# Patient Record
Sex: MALE | Race: OTHER | Employment: OTHER | URBAN - METROPOLITAN AREA
[De-identification: names, ages, dates, MRNs, and addresses within clinical notes are randomized per-mention and may not be internally consistent; named-entity substitution may affect disease eponyms.]

---

## 2023-05-24 LAB
CREAT ?TM UR-SCNC: 108.6 UMOL/L
EXT ALBUMIN URINE RANDOM: 26.5
HBA1C MFR BLD HPLC: 6.6 %
MICROALBUMIN/CREAT UR: 24 MG/G{CREAT}

## 2023-09-20 LAB — HBA1C MFR BLD HPLC: 6.3 %

## 2024-03-06 LAB — HBA1C MFR BLD HPLC: 6.7 %

## 2024-03-20 ENCOUNTER — OFFICE VISIT (OUTPATIENT)
Age: 49
End: 2024-03-20
Payer: COMMERCIAL

## 2024-03-20 VITALS
HEIGHT: 70 IN | SYSTOLIC BLOOD PRESSURE: 104 MMHG | BODY MASS INDEX: 24.34 KG/M2 | DIASTOLIC BLOOD PRESSURE: 64 MMHG | WEIGHT: 170 LBS

## 2024-03-20 DIAGNOSIS — Z12.11 COLON CANCER SCREENING: ICD-10-CM

## 2024-03-20 DIAGNOSIS — R79.89 ELEVATED LFTS: ICD-10-CM

## 2024-03-20 DIAGNOSIS — K70.9 ALCOHOLIC LIVER DISEASE (HCC): ICD-10-CM

## 2024-03-20 DIAGNOSIS — F10.20 ALCOHOL USE DISORDER, SEVERE, DEPENDENCE (HCC): Primary | ICD-10-CM

## 2024-03-20 DIAGNOSIS — Z11.59 SCREENING FOR VIRAL DISEASE: ICD-10-CM

## 2024-03-20 PROCEDURE — 99204 OFFICE O/P NEW MOD 45 MIN: CPT | Performed by: INTERNAL MEDICINE

## 2024-03-20 RX ORDER — GLIPIZIDE 5 MG/1
5 TABLET ORAL
COMMUNITY

## 2024-03-20 RX ORDER — BUPROPION HYDROCHLORIDE 150 MG/1
TABLET ORAL
COMMUNITY
Start: 2024-02-27

## 2024-03-20 RX ORDER — METOPROLOL SUCCINATE 100 MG/1
TABLET, EXTENDED RELEASE ORAL
COMMUNITY
Start: 2024-02-22

## 2024-03-20 RX ORDER — LISINOPRIL AND HYDROCHLOROTHIAZIDE 12.5; 1 MG/1; MG/1
TABLET ORAL
COMMUNITY
Start: 2024-03-19

## 2024-03-20 RX ORDER — EMPAGLIFLOZIN 10 MG/1
10 TABLET, FILM COATED ORAL
COMMUNITY
Start: 2024-02-27

## 2024-03-20 RX ORDER — CITALOPRAM 20 MG/1
TABLET ORAL
COMMUNITY
Start: 2024-02-27

## 2024-03-20 NOTE — LETTER
March 21, 2024     Ifeoma Panchal DO  [address]  [facility] [address]    Patient: Julio Peters   YOB: 1975   Date of Visit: 3/20/2024       Dear Dr. Panchal:    Thank you for referring Julio Peters to me for evaluation. Below are my notes for this consultation.    If you have questions, please do not hesitate to call me. I look forward to following your patient along with you.         Sincerely,        Joseph Velasquez MD        CC: No Recipients    Joseph Velasquez MD  3/21/2024 12:00 PM  Sign when Signing Visit    [facility] - Outpatient Consultation  Julio Peters 48 y.o. male MRN: [id number]  Encounter: [id number]    ASSESSMENT AND PLAN:      1. Alcohol use disorder, severe, dependence (HCC)  --Patient drinks about 7 beers a day.  He did stop drinking about a month and a half last fall after he was in rehab for 1 week.  -Planning to go back to rehab soon for longer period  -Patient probably would benefit from a medication like naltrexone to prevent cravings.  -Not prescribed it would be reasonable to try to find patient a provider who can give this medication  -Meantime reassessed liver with imaging studies and fib 4    Consider Naltrexone post detox/rehab treatment       2. Alcoholic liver disease (HCC)  --Patient with heavy alcohol use over the last 10 years.  Has been pretty much without interruption.  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  -Reassessing patient with imaging studies      - US abdomen complete; Future  - FIB-4 w/Reflex LAKE FibroSure; Future  - US elastography; Future    -On physical examination does not have stigmata of cirrhosis.  However if he continues on this course he will develop this over the next couple of years most likely and I have reviewed with him potential complications of cirrhosis including ascites, mental confusion, esophageal bleeding from varices and hepatocellular carcinoma    3.  Colon cancer screening    -Concerns about proceeding with colonoscopy but this patient was actively drinking.  With the volume of alcohol intake he would be at risk during the preparation and procedure process for alcohol withdrawal syndrome-probably safer at this time to proceed just with a Cologuard study unless the patient gets to rehab and stops drinking  -If he accomplishes this would be happy to schedule colonoscopy instead of Cologuard screening  -Can decide on this at time of follow-up visit as patient is low risk and this is a nonurgent matter    - Cologuard    4. Screening for viral disease  Screen for alternative reasons for elevated LFTs.  -Check chronic hepatitis profile    5. Elevated LFTs  --As above would like to monitor platelet count at this time as well.  - CBC and differential; Future  - Iron Panel (Includes Ferritin, Iron Sat%, Iron, and TIBC); Future  - Anti-smooth muscle antibody, IgG; Future  - CBC and differential  - Anti-smooth muscle antibody, IgG      Followup Appointment:  3 mo   ______________________________________________________________________     Patient referred for evaluation for abnormal liver function studies and alcoholic liver disease along with colon cancer screening by his personal physician Dr. Panchal    Chief Complaint   Patient presents with   • fatty liver and colon consult       HPI:   Julio Peters is a 48 y.o. year old male who presents for evaluation of alcoholic related liver disease.  Patient was noted by Dr. Panchal to have significantly elevated transaminases over the last several years.  Patient's transaminases have been consistently 2-3 times normal range and sometimes even higher.  Last September patient's AST and ALT with 220 and 221 respectively with normals being 40 and 44.  The patient subsequently went into rehab for a week and a half and repeat LFTs showed dramatic improvement with an AST of 70 and 73.  For at least 10 years the patient has been  drinking 7 beers a day.  If he has to attend to something he can cut it back to 1 or 2 beers a day but does drink on a daily basis.  He is never missed work or had a DUI as a result of his drinking.  Alcoholism runs in the family.  Patient has never quit drinking for more than a month and a half or so.  Patient also has a history of type 2 diabetes and hypertension he has no history of hepatitis.  no history of IV drug use.  Patient denies any abdominal pain.  Overall he feels well.  He does have a history of depression.  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    Historical Information  Past Medical History:   Diagnosis Date   • Alcohol dependence (HCC)    • Alcoholic fatty liver    • Allergic rhinitis    • Chronic recurrent major depressive disorder (HCC)    • Elevated LFTs    • HTN (hypertension)    • Overweight    • Psoriasis    • Steatosis of liver    • Type 2 diabetes mellitus with proteinuria     • Vitamin B deficiency      History reviewed. No pertinent surgical history.  Social History     Substance and Sexual Activity   Alcohol Use Yes    Comment: 7 beers a day     Social History     Substance and Sexual Activity   Drug Use Never     Social History     Tobacco Use   Smoking Status Never   Smokeless Tobacco Never     Family History   Problem Relation Age of Onset   • Diabetes Mother    • Hypertension Mother    • Hyperlipidemia Mother    • Hyperlipidemia Father    • Hypertension Father    • Diabetes Father    • Alcohol abuse Father    • Colon polyps Neg Hx    • Colon cancer Neg Hx        Meds/Allergies    Current Outpatient Medications:   •  buPROPion (WELLBUTRIN XL) 150 mg 24 hr tablet  •  citalopram (CeleXA) 20 mg tablet  •  glipiZIDE (GLUCOTROL) 5 mg tablet  •  Jardiance 10 MG TABS tablet  •  lisinopril-hydrochlorothiazide (PRINZIDE,ZESTORETIC) 10-12.5 MG per tablet  •  metoprolol succinate (TOPROL-XL) 100 mg 24 hr tablet    No Known Allergies    PHYSICAL EXAM:    Blood pressure 104/64, height 5'  "10\" (1.778 m), weight 77.1 kg (170 lb). Body mass index is 24.39 kg/m².  General Appearance: NAD, cooperative, alert  Eyes: Anicteric, conjunctiva pink   ENT:  Normocephalic, atraumatic, normal mucosa.-Perhaps mild parotid enlargement  Neck:  Supple, symmetrical, trachea midline,   Chest no gynecomastia  Resp:  Clear to auscultation bilaterally; no rales, rhonchi or wheezing; respirations unlabored   CV:  S1 S2, Regular rate and rhythm; no murmur, rub, or gallop.  GI:  Soft, non-tender, non-distended; normal bowel sounds; no masses, -liver edge palpable  Rectal: Deferred  Musculoskeletal: No cyanosis, clubbing or edema. Normal ROM.  Skin:  No jaundice, rashes, or lesions -no spider angiomata noted  Heme/Lymph: No palpable cervical lymphadenopathy  Psych: Normal affect, good eye contact  Neuro: No gross deficits, AAOx3    Lab Results:   [facility] labs 3/6/2024 bilirubin 0.5   normal ranges 0-40 and 0-44 respectively-albumin 4.4  CBC [facility] 11/1/2023 13 hematocrit 33 8 platelet count 160  Hemoglobin A1c 6.4 lipid profile normal      Radiology Results:   Abdominal ultrasound January 2021 [facility]-increased echogenicity of the liver consistent with fatty liver.  No splenomegaly      REVIEW OF SYSTEMS:    CONSTITUTIONAL: Denies any fever, chills, rigors, and weight loss.  HEENT: No earache or tinnitus. Denies hearing loss or visual disturbances.  CARDIOVASCULAR: No chest pain or palpitations.   RESPIRATORY: Denies any cough, hemoptysis, shortness of breath or dyspnea on exertion.  GASTROINTESTINAL: As noted in the History of Present Illness.   GENITOURINARY: No problems with urination. Denies any hematuria or dysuria.  NEUROLOGIC: No dizziness or vertigo, denies headaches.   MUSCULOSKELETAL: Denies any muscle or joint pain.   SKIN: Denies skin rashes or itching.   ENDOCRINE: Denies excessive thirst. Denies intolerance to heat or cold.  PSYCHOSOCIAL: Positive for depression  Denies " any recent memory loss.

## 2024-03-20 NOTE — PATIENT INSTRUCTIONS
[facility] - Outpatient Consultation  Julio Peters 48 y.o. male MRN: [id number]  Encounter: [id number]    ASSESSMENT AND PLAN:      1. Alcohol use disorder, severe, dependence (HCC)  --Patient drinks about 7 beers a day.  He did stop drinking about a month and a half last fall after he was in rehab for 1 week.  -Planning to go back to rehab soon for longer period  -Patient probably would benefit from a medication like naltrexone to prevent cravings.  -Not prescribed it would be reasonable to try to find patient a provider who can give this medication  -Meantime reassessed liver with imaging studies and fib 4      Consider Naltrexone post detox/rehab treatment       2. Alcoholic liver disease (HCC)  --Patient with heavy alcohol use over the last 10 years.  Has been pretty much without interruption.  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  -Reassessing patient with imaging studies      - US abdomen complete; Future  - FIB-4 w/Reflex [place]KE FibroSure; Future  - US elastography; Future    -On physical examination does not have stigmata of cirrhosis.  However if he continues on this course he will develop this over the next couple of years most likely and I have reviewed with him potential complications of cirrhosis including ascites, mental confusion, esophageal bleeding from varices and hepatocellular carcinoma    3. Colon cancer screening    -Concerns about proceeding with colonoscopy but this patient was actively drinking.  With the volume of alcohol intake he would be at risk during the preparation and procedure process for alcohol withdrawal syndrome-probably safer at this time to proceed just with a Cologuard study unless the patient gets to rehab and stops drinking  -If he accomplishes this would be happy to schedule colonoscopy instead of Cologuard screening  -Can decide on this at time of follow-up visit as patient is low risk and this is a  nonurgent matter    - Cologuard    4. Screening for viral disease  Screen for alternative reasons for elevated LFTs.  -Check chronic hepatitis profile    5. Elevated LFTs  --As above would like to monitor platelet count at this time as well.  - CBC and differential; Future  - Iron Panel (Includes Ferritin, Iron Sat%, Iron, and TIBC); Future  - Anti-smooth muscle antibody, IgG; Future  - CBC and differential  - Anti-smooth muscle antibody, IgG      Followup Appointment:  3 mo

## 2024-03-20 NOTE — PROGRESS NOTES
[facility] - Outpatient Consultation  Julio Peters 48 y.o. male MRN: [id number]  Encounter: [id number]    ASSESSMENT AND PLAN:      1. Alcohol use disorder, severe, dependence (HCC)  --Patient drinks about 7 beers a day.  He did stop drinking about a month and a half last fall after he was in rehab for 1 week.  -Planning to go back to rehab soon for longer period  -Patient probably would benefit from a medication like naltrexone to prevent cravings.  -Not prescribed it would be reasonable to try to find patient a provider who can give this medication  -Meantime reassessed liver with imaging studies and fib 4    Consider Naltrexone post detox/rehab treatment       2. Alcoholic liver disease (HCC)  --Patient with heavy alcohol use over the last 10 years.  Has been pretty much without interruption.  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  -Reassessing patient with imaging studies      - US abdomen complete; Future  - FIB-4 w/Reflex LAKE FibroSure; Future  - US elastography; Future    -On physical examination does not have stigmata of cirrhosis.  However if he continues on this course he will develop this over the next couple of years most likely and I have reviewed with him potential complications of cirrhosis including ascites, mental confusion, esophageal bleeding from varices and hepatocellular carcinoma    3. Colon cancer screening    -Concerns about proceeding with colonoscopy but this patient was actively drinking.  With the volume of alcohol intake he would be at risk during the preparation and procedure process for alcohol withdrawal syndrome-probably safer at this time to proceed just with a Cologuard study unless the patient gets to rehab and stops drinking  -If he accomplishes this would be happy to schedule colonoscopy instead of Cologuard screening  -Can decide on this at time of follow-up visit as patient is low risk and this is a  nonurgent matter    - Cologuard    4. Screening for viral disease  Screen for alternative reasons for elevated LFTs.  -Check chronic hepatitis profile    5. Elevated LFTs  --As above would like to monitor platelet count at this time as well.  - CBC and differential; Future  - Iron Panel (Includes Ferritin, Iron Sat%, Iron, and TIBC); Future  - Anti-smooth muscle antibody, IgG; Future  - CBC and differential  - Anti-smooth muscle antibody, IgG      Followup Appointment:  3 mo   ______________________________________________________________________     Patient referred for evaluation for abnormal liver function studies and alcoholic liver disease along with colon cancer screening by his personal physician Dr. Panchal    Chief Complaint   Patient presents with   • fatty liver and colon consult       HPI:   Julio Peters is a 48 y.o. year old male who presents for evaluation of alcoholic related liver disease.  Patient was noted by Dr. Panchal to have significantly elevated transaminases over the last several years.  Patient's transaminases have been consistently 2-3 times normal range and sometimes even higher.  Last September patient's AST and ALT with 220 and 221 respectively with normals being 40 and 44.  The patient subsequently went into rehab for a week and a half and repeat LFTs showed dramatic improvement with an AST of 70 and 73.  For at least 10 years the patient has been drinking 7 beers a day.  If he has to attend to something he can cut it back to 1 or 2 beers a day but does drink on a daily basis.  He is never missed work or had a DUI as a result of his drinking.  Alcoholism runs in the family.  Patient has never quit drinking for more than a month and a half or so.  Patient also has a history of type 2 diabetes and hypertension he has no history of hepatitis.  no history of IV drug use.  Patient denies any abdominal pain.  Overall he feels well.  He does have a history of depression.  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    Historical Information   Past Medical History:   Diagnosis Date   • Alcohol dependence (HCC)    • Alcoholic fatty liver    • Allergic rhinitis    • Chronic recurrent major depressive disorder (HCC)    • Elevated LFTs    • HTN (hypertension)    • Overweight    • Psoriasis    • Steatosis of liver    • Type 2 diabetes mellitus with proteinuria     • Vitamin B deficiency      History reviewed. No pertinent surgical history.  Social History     Substance and Sexual Activity   Alcohol Use Yes    Comment: 7 beers a day     Social History     Substance and Sexual Activity   Drug Use Never     Social History     Tobacco Use   Smoking Status Never   Smokeless Tobacco Never     Family History   Problem Relation Age of Onset   • Diabetes Mother    • Hypertension Mother    • Hyperlipidemia Mother    • Hyperlipidemia Father    • Hypertension Father    • Diabetes Father    • Alcohol abuse Father    • Colon polyps Neg Hx    • Colon cancer Neg Hx        Meds/Allergies     Current Outpatient Medications:   •  buPROPion (WELLBUTRIN XL) 150 mg 24 hr tablet  •  citalopram (CeleXA) 20 mg tablet  •  glipiZIDE (GLUCOTROL) 5 mg tablet  •  Jardiance 10 MG TABS tablet  •  lisinopril-hydrochlorothiazide (PRINZIDE,ZESTORETIC) 10-12.5 MG per tablet  •  metoprolol succinate (TOPROL-XL) 100 mg 24 hr tablet    No Known Allergies    PHYSICAL EXAM:    Blood pressure 104/64, height 5' 10\" (1.778 m), weight 77.1 kg (170 lb). Body mass index is 24.39 kg/m².  General Appearance: NAD, cooperative, alert  Eyes: Anicteric, conjunctiva pink   ENT:  Normocephalic, atraumatic, normal mucosa.-Perhaps mild parotid enlargement  Neck:  Supple, symmetrical, trachea midline,   Chest no gynecomastia  Resp:  Clear to auscultation bilaterally; no rales, rhonchi or wheezing; respirations unlabored   CV:  S1 S2, Regular rate and rhythm; no murmur, rub, or gallop.  GI:  Soft, non-tender, non-distended; normal bowel sounds; no masses, -liver " edge palpable  Rectal: Deferred  Musculoskeletal: No cyanosis, clubbing or edema. Normal ROM.  Skin:  No jaundice, rashes, or lesions -no spider angiomata noted  Heme/Lymph: No palpable cervical lymphadenopathy  Psych: Normal affect, good eye contact  Neuro: No gross deficits, AAOx3    Lab Results:   [facility] labs 3/6/2024 bilirubin 0.5   normal ranges 0-40 and 0-44 respectively-albumin 4.4  CBC [facility] 11/1/2023 13 hematocrit 33 8 platelet count 160  Hemoglobin A1c 6.4 lipid profile normal      Radiology Results:   Abdominal ultrasound January 2021 [facility]-increased echogenicity of the liver consistent with fatty liver.  No splenomegaly      REVIEW OF SYSTEMS:    CONSTITUTIONAL: Denies any fever, chills, rigors, and weight loss.  HEENT: No earache or tinnitus. Denies hearing loss or visual disturbances.  CARDIOVASCULAR: No chest pain or palpitations.   RESPIRATORY: Denies any cough, hemoptysis, shortness of breath or dyspnea on exertion.  GASTROINTESTINAL: As noted in the History of Present Illness.   GENITOURINARY: No problems with urination. Denies any hematuria or dysuria.  NEUROLOGIC: No dizziness or vertigo, denies headaches.   MUSCULOSKELETAL: Denies any muscle or joint pain.   SKIN: Denies skin rashes or itching.   ENDOCRINE: Denies excessive thirst. Denies intolerance to heat or cold.  PSYCHOSOCIAL: Positive for depression  Denies any recent memory loss.

## 2024-03-21 PROBLEM — F10.20 ALCOHOL USE DISORDER, SEVERE, DEPENDENCE (HCC): Status: ACTIVE | Noted: 2024-03-21

## 2024-03-23 LAB
A2 MACROGLOB SERPL-MCNC: 142 MG/DL (ref 110–276)
ACTIN IGG SERPL-ACNC: 11 UNITS (ref 0–19)
ALT SERPL W P-5'-P-CCNC: 114 IU/L (ref 0–55)
ALT SERPL-CCNC: 102 IU/L (ref 0–44)
APO A-I SERPL-MCNC: 152 MG/DL (ref 101–178)
AST SERPL W P-5'-P-CCNC: 120 IU/L (ref 0–40)
AST SERPL-CCNC: 116 IU/L (ref 0–40)
BASOPHILS # BLD AUTO: 0 X10E3/UL (ref 0–0.2)
BASOPHILS NFR BLD AUTO: 0 %
BILIRUB SERPL-MCNC: 0.6 MG/DL (ref 0–1.2)
CHOLEST SERPL-MCNC: 219 MG/DL (ref 100–199)
EOSINOPHIL # BLD AUTO: 0.1 X10E3/UL (ref 0–0.4)
EOSINOPHIL NFR BLD AUTO: 1 %
ERYTHROCYTE [DISTWIDTH] IN BLOOD BY AUTOMATED COUNT: 11.9 % (ref 11.6–15.4)
FERRITIN SERPL-MCNC: 378 NG/ML (ref 30–400)
FIB 4 INDEX: 2.71 (ref 0–2.67)
FIBROSIS SCORING:: ABNORMAL
FIBROSIS STAGE SERPL QL: ABNORMAL
GGT SERPL-CCNC: 238 IU/L (ref 0–65)
GLUCOSE SERPL-MCNC: 147 MG/DL (ref 70–99)
HAPTOGLOB SERPL-MCNC: 120 MG/DL (ref 23–355)
HAV IGM SERPL QL IA: NEGATIVE
HBV CORE IGM SERPL QL IA: NEGATIVE
HBV SURFACE AG SERPL QL IA: NEGATIVE
HCT VFR BLD AUTO: 46.3 % (ref 37.5–51)
HCV AB S/CO SERPL IA: NON REACTIVE
HGB BLD-MCNC: 15.6 G/DL (ref 13–17.7)
IMM GRANULOCYTES # BLD: 0 X10E3/UL (ref 0–0.1)
IMM GRANULOCYTES NFR BLD: 0 %
INTERPRETATION: ABNORMAL
IRON SATN MFR SERPL: 23 % (ref 15–55)
IRON SERPL-MCNC: 85 UG/DL (ref 38–169)
LABORATORY COMMENT REPORT: ABNORMAL
LIVER FIBR SCORE SERPL CALC.FIBROSURE: 0.27 (ref 0–0.21)
LYMPHOCYTES # BLD AUTO: 1.9 X10E3/UL (ref 0.7–3.1)
LYMPHOCYTES NFR BLD AUTO: 30 %
MCH RBC QN AUTO: 29.1 PG (ref 26.6–33)
MCHC RBC AUTO-ENTMCNC: 33.7 G/DL (ref 31.5–35.7)
MCV RBC AUTO: 86 FL (ref 79–97)
MONOCYTES # BLD AUTO: 0.7 X10E3/UL (ref 0.1–0.9)
MONOCYTES NFR BLD AUTO: 12 %
NASH GRADE SERPL QL: ABNORMAL
NASH SCORE SERPL: 0.88 (ref 0–0.25)
NEUTROPHILS # BLD AUTO: 3.6 X10E3/UL (ref 1.4–7)
NEUTROPHILS NFR BLD AUTO: 57 %
PLATELET # BLD AUTO: 195 X10E3/UL (ref 150–450)
RBC # BLD AUTO: 5.37 X10E6/UL (ref 4.14–5.8)
REF LAB TEST METHOD: ABNORMAL
SL AMB INTERPRETATION: NORMAL
SL AMB NASH SCORING: ABNORMAL
SL AMB STEATOSIS GRADE: ABNORMAL
SL AMB STEATOSIS SCORE: 0.86 (ref 0–0.4)
STEATOSIS SCORING: ABNORMAL
TEST PERFORMANCE INFO SPEC: ABNORMAL
TIBC SERPL-MCNC: 376 UG/DL (ref 250–450)
TRIGL SERPL-MCNC: 161 MG/DL (ref 0–149)
UIBC SERPL-MCNC: 291 UG/DL (ref 111–343)
WBC # BLD AUTO: 6.4 X10E3/UL (ref 3.4–10.8)

## 2024-03-25 ENCOUNTER — TELEPHONE (OUTPATIENT)
Dept: GASTROENTEROLOGY | Facility: CLINIC | Age: 49
End: 2024-03-25

## 2024-03-25 DIAGNOSIS — Z11.59 SCREENING FOR VIRAL DISEASE: Primary | ICD-10-CM

## 2024-03-25 NOTE — RESULT ENCOUNTER NOTE
I called the patient and left a voice message.  Fib 4 test indicates possible cirrhosis by elevated levels.  FibroSure test does not show's findings consistent with cirrhosis and low level of scarring.  High levels noted of steatosis not unexpected.  Iron panel smooth muscle antibody all negative CBC normal.  Await elasticity study.  Negative acute hepatitis studies but would like to check for chronic antibodies to see if patient needs vaccination.  Will place order.  Please send copy of labs and this note to Dr. CARLOS Panchal [facility]

## 2024-03-25 NOTE — TELEPHONE ENCOUNTER
Left message for patient about labs.  Had acute hepatitis panel but would like to check chronic hepatitis panel please send slip to patient

## 2024-03-28 NOTE — TELEPHONE ENCOUNTER
Pt was calling to find out more information as to why he needs more testing. Please reach back out to the pt

## 2024-04-01 LAB — COLOGUARD RESULT REPORTABLE: POSITIVE

## 2024-04-02 ENCOUNTER — TELEPHONE (OUTPATIENT)
Dept: GASTROENTEROLOGY | Facility: CLINIC | Age: 49
End: 2024-04-02

## 2024-04-02 NOTE — TELEPHONE ENCOUNTER
I called the patient and left a voice message.  His Cologuard study is positive.  Need to schedule colonoscopy in the endoscopy lab  -Asked patient to call back and let me know his alcohol intake status.  Cutting back quite a bit from at least 6-7 beers a day to 1-2.  If he is drinking heavily will need to postpone.  Will try to schedule him for about 1 month out.  He is on Jardiance so this may need to be held   Please copy PCP

## 2024-04-04 ENCOUNTER — PREP FOR PROCEDURE (OUTPATIENT)
Dept: GASTROENTEROLOGY | Facility: CLINIC | Age: 49
End: 2024-04-04

## 2024-04-04 DIAGNOSIS — Z12.11 SCREENING FOR COLON CANCER: Primary | ICD-10-CM

## 2024-04-04 DIAGNOSIS — R19.5 POSITIVE FIT (FECAL IMMUNOCHEMICAL TEST): ICD-10-CM

## 2024-04-04 NOTE — TELEPHONE ENCOUNTER
Patients GI provider:       Number to return call: ( [phone]    Reason for call: Pt calling back needs to speak to nurse about alcohol intake. Please call back     Scheduled procedure/appointment date if applicable: Apt/procedure

## 2024-04-04 NOTE — TELEPHONE ENCOUNTER
The patient and left a voice message.  Did review the case with anesthesia Dr. Maher.  He feels as though it is probably safe for us to proceed with colonoscopy here at the [facility].  Advising patient to really do his best to cut back to drinking 1-2 beers per day.  0 would be ideal.  Should be at low risk for alcohol withdrawal

## 2024-04-04 NOTE — TELEPHONE ENCOUNTER
Spoke to patient. He stated that his alcohol intake has decreased. He is now drinking 2-3 alcoholic beverages a day.       Scheduled date of colonoscopy (as of today):05/07/24  Physician performing colonoscopy:Dr Velasquez  Location of colonoscopy: [facility]  Bowel prep reviewed with patient: Miralax/dulcolax  Instructions emailed to patient by: rachana  Clearances: none  : Hong Peters    Patient is taking Jardiance

## 2024-04-17 ENCOUNTER — TELEPHONE (OUTPATIENT)
Age: 49
End: 2024-04-17

## 2024-04-17 ENCOUNTER — OFFICE VISIT (OUTPATIENT)
Dept: GASTROENTEROLOGY | Facility: CLINIC | Age: 49
End: 2024-04-17
Payer: COMMERCIAL

## 2024-04-17 ENCOUNTER — TELEPHONE (OUTPATIENT)
Dept: GASTROENTEROLOGY | Facility: CLINIC | Age: 49
End: 2024-04-17

## 2024-04-17 VITALS
DIASTOLIC BLOOD PRESSURE: 90 MMHG | WEIGHT: 170 LBS | HEIGHT: 70 IN | SYSTOLIC BLOOD PRESSURE: 140 MMHG | BODY MASS INDEX: 24.34 KG/M2

## 2024-04-17 DIAGNOSIS — F10.20 ALCOHOL USE DISORDER, SEVERE, DEPENDENCE (HCC): ICD-10-CM

## 2024-04-17 DIAGNOSIS — R19.5 POSITIVE COLORECTAL CANCER SCREENING USING COLOGUARD TEST: ICD-10-CM

## 2024-04-17 DIAGNOSIS — Z12.11 SCREENING FOR COLON CANCER: ICD-10-CM

## 2024-04-17 DIAGNOSIS — K70.9 ALCOHOLIC LIVER DISEASE (HCC): Primary | ICD-10-CM

## 2024-04-17 PROCEDURE — 99214 OFFICE O/P EST MOD 30 MIN: CPT | Performed by: INTERNAL MEDICINE

## 2024-04-17 NOTE — PROGRESS NOTES
"[facility] - Outpatient Follow-up Note  Julio Peters 48 y.o. male MRN: [id number]  Encounter: [id number]    ASSESSMENT AND PLAN:      1. Alcoholic liver disease (HCC)  --Patient with long-term alcohol use.  Has cut back to 3 beers per day.  Recent ultrasonography did not show splenomegaly.  Does have an enlarged liver.  Liver fibrosis panel  ( FIB 4 ) showed findings consistent with cirrhosis i.e. F4 score-  - a second serologic evaluation-than \"LAKE \" fibrosis score was more favorable and just indicated F1 score indicating mild fibrosis.  Will need to sort out inconsistencies with ultrasound elastography     -Will confirm whether patient has cirrhosis.  Check elastography study.  - US elastography; Future  -Repeat LFTs and INR  - check HBV/HAV immune status for potential vaccination     -If it appears that we have confirmed diagnosis of cirrhosis by elastography then we will add EGD to the patient's colonoscopic examination which is being evaluated for positive Cologuard study    .This procedure has been fully reviewed with the patient and written informed consent has been obtained.     2. Positive colorectal cancer screening using Cologuard test  -No major symptoms.  Patient interested in starting rehab this week.  -Originally scheduled for colonoscopy in about 2 weeks.  Will push back till early June and perhaps do EGD at the same time    3. Screening for colon cancer  -Please see above    4. Alcohol use disorder, severe, dependence (HCC)  --Took the initiative and saw her psychiatrist.  Patient does report that the psychiatrist is willing to give naltrexone or other medications to prevent cravings.  -May need a hepatology consult prior to make sure this is safe to administer.      Followup Appointment: 10 weeks  ______________________________________________________________________    Chief Complaint   Patient presents with    Follow-up     Pt had US and labs done, Hep " B     HPI: Patient returns to the office with respect to follow-up for his alcoholic liver disease and a positive Cologuard study.  Patient reports to me that he is cut back on his drinking significantly since our visit a little over a month ago.  Specifically he is previously drinking about 8-9 beers per day.  Now he states it is down to about 3.  Patient is seen psychiatrist as of late and was told that he would be willing to prescribe Vivitrol injections to help prevent cravings in the future.  Patient is willing to start rehab at this time and wanted to know if he should start rehab postpone this until after his scheduled colonoscopy.  This was arranged after finding a positive Cologuard study.  Patient has no overt bleeding.  We followed up on the patient with respect to lab studies.  It should be noted patient's father had history of alcohol dependence and alcoholic liver disease.  He was able to successfully stop drinking completely.  Patient's acute hepatitis panel was negative-a chronic hepatitis panel had been ordered to check on the patient's immune status but this has not been performed.  Smooth muscle antibodies and iron studies were negative.  Patient had a recent abdominal ultrasound which showed hepatomegaly with increased echogenicity consistent steatosis and no splenomegaly.  Patient's liver function studies improved with decrease of his alcohol intake.    Historical Information   Past Medical History:   Diagnosis Date    Alcohol dependence (HCC)     Alcoholic fatty liver     Allergic rhinitis     Chronic recurrent major depressive disorder (HCC)     Elevated LFTs     HTN (hypertension)     Overweight     Psoriasis     Steatosis of liver     Type 2 diabetes mellitus with proteinuria  (HCC)     Vitamin B deficiency      History reviewed. No pertinent surgical history.  Social History     Substance and Sexual Activity   Alcohol Use Yes    Comment: 7 beers a day     Social History     Substance and  "Sexual Activity   Drug Use Never     Social History     Tobacco Use   Smoking Status Never   Smokeless Tobacco Never     Family History   Problem Relation Age of Onset    Diabetes Mother     Hypertension Mother     Hyperlipidemia Mother     Hyperlipidemia Father     Hypertension Father     Diabetes Father     Alcohol abuse Father     Colon polyps Neg Hx     Colon cancer Neg Hx          Current Outpatient Medications:     buPROPion (WELLBUTRIN XL) 150 mg 24 hr tablet    citalopram (CeleXA) 20 mg tablet    glipiZIDE (GLUCOTROL) 5 mg tablet    Jardiance 10 MG TABS tablet    lisinopril-hydrochlorothiazide (PRINZIDE,ZESTORETIC) 10-12.5 MG per tablet    metoprolol succinate (TOPROL-XL) 100 mg 24 hr tablet  No Known Allergies  Reviewed medications and allergies and updated as indicated    PHYSICAL EXAM:    Blood pressure 140/90, height 5' 10\" (1.778 m), weight 77.1 kg (170 lb). Body mass index is 24.39 kg/m².  General Appearance: NAD, cooperative, alert  Eyes: Anicteric, conjunctiva pink  ENT:  Normocephalic, atraumatic, normal mucosa.    Neck:  Supple, symmetrical, trachea midline  Resp:  Clear to auscultation bilaterally; no rales, rhonchi or wheezing; respirations unlabored   CV:  S1 S2, Regular rate and rhythm; no murmur, rub, or gallop.  GI:  Soft, non-tender, non-distended; normal bowel sounds; no masses, no organomegaly   Rectal: Deferred  Musculoskeletal: No cyanosis, clubbing or edema. Normal ROM.  Skin:  No jaundice, rashes, or lesions   Heme/Lymph: No palpable cervical lymphadenopathy  Psych: Normal affect, good eye contact  Neuro: No gross deficits, AAOx3    Lab Results:   Lab Results   Component Value Date    WBC 6.4 03/20/2024    HGB 15.6 03/20/2024    HCT 46.3 03/20/2024    MCV 86 03/20/2024     03/20/2024     Lab Results   Component Value Date     (H) 03/20/2024     (H) 03/20/2024     (H) 03/20/2024     (H) 03/20/2024       Radiology Results:   Ultrasonography [place] " [facility] 3/26/2024-revealed increased echogenicity and an enlarged liver.  No splenomegaly.

## 2024-04-17 NOTE — PATIENT INSTRUCTIONS
"[facility] - Outpatient Follow-up Note  Julio Peters 48 y.o. male MRN: [id number]  Encounter: [id number]    ASSESSMENT AND PLAN:      1. Alcoholic liver disease (HCC)  --Patient with long-term alcohol use.  Has cut back to 3 beers per day.  Recent ultrasonography did not show splenomegaly.  Does have an enlarged liver.  Liver fibrosis panel showed findings consistent with cirrhosis i.e. F4 score  -second serologic evaluation-than \"LAKE \" fibrosis score was more favorable and just indicated F1 score indicating mild fibrosis.  Will need to sort out inconsistencies with ultrasound elastography    -Will confirm whether patient has cirrhosis.  Check elastography study.  - US elastography; Future  -Repeat LFTs and INR  -Check HBV/ HAV immune status for potential vaccination    -If it appears that we have confirmed diagnosis of cirrhosis by elastography then we will add EGD to the patient's colonoscopic examination which is being evaluated for positive Cologuard study    .This procedure has been fully reviewed with the patient and written informed consent has been obtained.     2. Positive colorectal cancer screening using Cologuard test  -No major symptoms.  Patient interested in starting rehab this week.  -Originally scheduled for colonoscopy in about 2 weeks.  Will push back till early June and perhaps do EGD at the same time    3. Screening for colon cancer  -Please see above    4. Alcohol use disorder, severe, dependence (HCC)  --Took the initiative and saw her psychiatrist.  Patient does report that the psychiatrist is willing to give naltrexone or other medications to prevent cravings.  -May need a hepatology consult prior to make sure this is safe to administer.      Followup Appointment: 10 weeks  "

## 2024-04-17 NOTE — TELEPHONE ENCOUNTER
Pt called stating he was called by  and told not to come today and to call to make another appointment to discuss results. Pt was notified that he is still to go to appointment

## 2024-04-17 NOTE — TELEPHONE ENCOUNTER
Scheduled date of combo (as of today): 6/4/2024  Physician performing combo: DIANA  Location of combo: [facility]  Bowel prep reviewed with patient: Miralax  Instructions reviewed with patient by: WD  Clearances: N    Colonoscopy date changed per Pt and Dr. Velasquez. EGD added for combo. WLD

## 2024-04-19 LAB
ALBUMIN SERPL-MCNC: 4.5 G/DL (ref 4.1–5.1)
ALBUMIN/GLOB SERPL: 1.8 {RATIO} (ref 1.2–2.2)
ALP SERPL-CCNC: 79 IU/L (ref 44–121)
ALT SERPL-CCNC: 106 IU/L (ref 0–44)
AST SERPL-CCNC: 151 IU/L (ref 0–40)
BILIRUB SERPL-MCNC: 1.3 MG/DL (ref 0–1.2)
BUN SERPL-MCNC: 8 MG/DL (ref 6–24)
BUN/CREAT SERPL: 10 (ref 9–20)
CALCIUM SERPL-MCNC: 9.2 MG/DL (ref 8.7–10.2)
CHLORIDE SERPL-SCNC: 100 MMOL/L (ref 96–106)
CO2 SERPL-SCNC: 24 MMOL/L (ref 20–29)
CREAT SERPL-MCNC: 0.82 MG/DL (ref 0.76–1.27)
EGFR: 110 ML/MIN/1.73
GLOBULIN SER-MCNC: 2.5 G/DL (ref 1.5–4.5)
GLUCOSE SERPL-MCNC: 177 MG/DL (ref 70–99)
HBV SURFACE AB SER-ACNC: 10.2 MIU/ML
INR PPP: 1.1 (ref 0.9–1.2)
POTASSIUM SERPL-SCNC: 5 MMOL/L (ref 3.5–5.2)
PROT SERPL-MCNC: 7 G/DL (ref 6–8.5)
PROTHROMBIN TIME: 11.2 SEC (ref 9.1–12)
SODIUM SERPL-SCNC: 140 MMOL/L (ref 134–144)

## 2024-05-21 ENCOUNTER — ANESTHESIA (OUTPATIENT)
Dept: ANESTHESIOLOGY | Facility: AMBULATORY SURGERY CENTER | Age: 49
End: 2024-05-21

## 2024-05-21 ENCOUNTER — ANESTHESIA EVENT (OUTPATIENT)
Dept: ANESTHESIOLOGY | Facility: AMBULATORY SURGERY CENTER | Age: 49
End: 2024-05-21

## 2024-05-24 ENCOUNTER — TELEPHONE (OUTPATIENT)
Dept: GASTROENTEROLOGY | Facility: CLINIC | Age: 49
End: 2024-05-24

## 2024-05-24 NOTE — TELEPHONE ENCOUNTER
Procedure confirmed  Colonoscopy/Endoscopy     Via: Spoke with patient.      Instructions given: Given to Patient at Visit     Prep Given: Miralax/Dulcolax    Call the office if there are any questions.

## 2024-05-29 ENCOUNTER — TELEPHONE (OUTPATIENT)
Dept: GASTROENTEROLOGY | Facility: CLINIC | Age: 49
End: 2024-05-29

## 2024-05-29 NOTE — TELEPHONE ENCOUNTER
I called the patient about his elasticity study.  Borderline presence of cirrhosis-moderate liver stiffness  Left message on answering machine.  Patient had been considering checking himself into rehab for alcohol use disorder.  Would like to check on his status.  Need to get him back for colonoscopy for positive Cologuard study.  Will asked staff to reach out

## 2024-06-04 ENCOUNTER — ANESTHESIA EVENT (OUTPATIENT)
Dept: GASTROENTEROLOGY | Facility: AMBULATORY SURGERY CENTER | Age: 49
End: 2024-06-04

## 2024-06-04 ENCOUNTER — HOSPITAL ENCOUNTER (OUTPATIENT)
Dept: GASTROENTEROLOGY | Facility: AMBULATORY SURGERY CENTER | Age: 49
Discharge: HOME | End: 2024-06-04
Payer: COMMERCIAL

## 2024-06-04 ENCOUNTER — ANESTHESIA (OUTPATIENT)
Dept: GASTROENTEROLOGY | Facility: AMBULATORY SURGERY CENTER | Age: 49
End: 2024-06-04

## 2024-06-04 VITALS
HEIGHT: 70 IN | BODY MASS INDEX: 24.34 KG/M2 | RESPIRATION RATE: 16 BRPM | HEART RATE: 75 BPM | OXYGEN SATURATION: 99 % | DIASTOLIC BLOOD PRESSURE: 90 MMHG | TEMPERATURE: 97.3 F | SYSTOLIC BLOOD PRESSURE: 133 MMHG | WEIGHT: 170 LBS

## 2024-06-04 DIAGNOSIS — R19.5 POSITIVE FIT (FECAL IMMUNOCHEMICAL TEST): ICD-10-CM

## 2024-06-04 DIAGNOSIS — Z12.11 SCREENING FOR COLON CANCER: ICD-10-CM

## 2024-06-04 DIAGNOSIS — K70.9 ALCOHOLIC LIVER DISEASE (HCC): ICD-10-CM

## 2024-06-04 PROBLEM — E11.9 TYPE 2 DIABETES MELLITUS, WITHOUT LONG-TERM CURRENT USE OF INSULIN (HCC): Status: ACTIVE | Noted: 2024-06-04

## 2024-06-04 PROBLEM — I10 PRIMARY HYPERTENSION: Status: ACTIVE | Noted: 2024-06-04

## 2024-06-04 PROCEDURE — 45380 COLONOSCOPY AND BIOPSY: CPT | Performed by: INTERNAL MEDICINE

## 2024-06-04 PROCEDURE — 43239 EGD BIOPSY SINGLE/MULTIPLE: CPT | Performed by: INTERNAL MEDICINE

## 2024-06-04 PROCEDURE — 88305 TISSUE EXAM BY PATHOLOGIST: CPT | Performed by: STUDENT IN AN ORGANIZED HEALTH CARE EDUCATION/TRAINING PROGRAM

## 2024-06-04 PROCEDURE — 45385 COLONOSCOPY W/LESION REMOVAL: CPT | Performed by: INTERNAL MEDICINE

## 2024-06-04 RX ORDER — PROPOFOL 10 MG/ML
INJECTION, EMULSION INTRAVENOUS AS NEEDED
Status: DISCONTINUED | OUTPATIENT
Start: 2024-06-04 | End: 2024-06-04

## 2024-06-04 RX ORDER — MIDAZOLAM HYDROCHLORIDE 2 MG/2ML
INJECTION, SOLUTION INTRAMUSCULAR; INTRAVENOUS AS NEEDED
Status: DISCONTINUED | OUTPATIENT
Start: 2024-06-04 | End: 2024-06-04

## 2024-06-04 RX ORDER — SODIUM CHLORIDE, SODIUM LACTATE, POTASSIUM CHLORIDE, CALCIUM CHLORIDE 600; 310; 30; 20 MG/100ML; MG/100ML; MG/100ML; MG/100ML
50 INJECTION, SOLUTION INTRAVENOUS CONTINUOUS
Status: DISCONTINUED | OUTPATIENT
Start: 2024-06-04 | End: 2024-06-08 | Stop reason: HOSPADM

## 2024-06-04 RX ORDER — LIDOCAINE HYDROCHLORIDE 10 MG/ML
INJECTION, SOLUTION EPIDURAL; INFILTRATION; INTRACAUDAL; PERINEURAL AS NEEDED
Status: DISCONTINUED | OUTPATIENT
Start: 2024-06-04 | End: 2024-06-04

## 2024-06-04 RX ADMIN — LIDOCAINE HYDROCHLORIDE 100 MG: 10 INJECTION, SOLUTION EPIDURAL; INFILTRATION; INTRACAUDAL; PERINEURAL at 09:22

## 2024-06-04 RX ADMIN — SODIUM CHLORIDE, SODIUM LACTATE, POTASSIUM CHLORIDE, CALCIUM CHLORIDE: 600; 310; 30; 20 INJECTION, SOLUTION INTRAVENOUS at 09:47

## 2024-06-04 RX ADMIN — PROPOFOL 50 MG: 10 INJECTION, EMULSION INTRAVENOUS at 09:48

## 2024-06-04 RX ADMIN — PROPOFOL 50 MG: 10 INJECTION, EMULSION INTRAVENOUS at 09:27

## 2024-06-04 RX ADMIN — PROPOFOL 50 MG: 10 INJECTION, EMULSION INTRAVENOUS at 09:39

## 2024-06-04 RX ADMIN — PROPOFOL 50 MG: 10 INJECTION, EMULSION INTRAVENOUS at 09:32

## 2024-06-04 RX ADMIN — MIDAZOLAM HYDROCHLORIDE 2 MG: 2 INJECTION, SOLUTION INTRAMUSCULAR; INTRAVENOUS at 09:22

## 2024-06-04 RX ADMIN — SODIUM CHLORIDE, SODIUM LACTATE, POTASSIUM CHLORIDE, CALCIUM CHLORIDE 50 ML/HR: 600; 310; 30; 20 INJECTION, SOLUTION INTRAVENOUS at 08:44

## 2024-06-04 RX ADMIN — PROPOFOL 100 MG: 10 INJECTION, EMULSION INTRAVENOUS at 09:22

## 2024-06-04 NOTE — ANESTHESIA PREPROCEDURE EVALUATION
Procedure:  COLONOSCOPY  EGD    Relevant Problems   CARDIO   (+) Primary hypertension      ENDO   (+) Type 2 diabetes mellitus, without long-term current use of insulin (HCC)      Other   (+) Alcohol use disorder, severe, dependence (HCC) (Down to 2 beers/day. None last 4 days)        Physical Exam    Airway    Mallampati score: II  TM Distance: >3 FB  Neck ROM: full     Dental   No notable dental hx     Cardiovascular      Pulmonary      Other Findings        Anesthesia Plan  ASA Score- 3     Anesthesia Type- IV sedation with anesthesia with ASA Monitors.         Additional Monitors:     Airway Plan:            Plan Factors-Exercise tolerance (METS): >4 METS.    Chart reviewed.    Patient summary reviewed.    Patient is not a current smoker.              Induction- intravenous.    Postoperative Plan-         Informed Consent- Anesthetic plan and risks discussed with patient.  I personally reviewed this patient with the CRNA. Discussed and agreed on the Anesthesia Plan with the CRNA..

## 2024-06-04 NOTE — H&P
History and Physical - [facility]  Julio Peters 48 y.o. male MRN: [id number]    HPI: Julio Peters is a 48 y.o. year old male who presents for EGD and colonoscopy.  Patient does have a history of alcoholic liver disease possible cirrhosis.  In addition had a positive Cologuard study    REVIEW OF SYSTEMS: Per the HPI, and otherwise unremarkable.    Historical Information   Past Medical History:   Diagnosis Date    Alcohol dependence (HCC)     Alcoholic fatty liver     Allergic rhinitis     Chronic recurrent major depressive disorder (HCC)     Diabetes mellitus (HCC)     Elevated LFTs     HTN (hypertension)     Hypertension     Overweight     Psoriasis     Steatosis of liver     Type 2 diabetes mellitus with proteinuria  (HCC)     Vitamin B deficiency      History reviewed. No pertinent surgical history.  Social History   Social History     Substance and Sexual Activity   Alcohol Use Yes    Comment: 2 beers a day     Social History     Substance and Sexual Activity   Drug Use Never     Social History     Tobacco Use   Smoking Status Never   Smokeless Tobacco Never     Family History   Problem Relation Age of Onset    Diabetes Mother     Hypertension Mother     Hyperlipidemia Mother     Hyperlipidemia Father     Hypertension Father     Diabetes Father     Alcohol abuse Father     Colon polyps Neg Hx     Colon cancer Neg Hx        Meds/Allergies       Current Outpatient Medications:     buPROPion (WELLBUTRIN XL) 150 mg 24 hr tablet    citalopram (CeleXA) 20 mg tablet    glipiZIDE (GLUCOTROL) 5 mg tablet    lisinopril-hydrochlorothiazide (PRINZIDE,ZESTORETIC) 10-12.5 MG per tablet    metoprolol succinate (TOPROL-XL) 100 mg 24 hr tablet    Jardiance 10 MG TABS tablet    Current Facility-Administered Medications:     lactated ringers infusion, 50 mL/hr, Intravenous, Continuous, 50 mL/hr at 06/04/24 0844    No Known Allergies    Objective     /93   Pulse 73   Temp (!) 97.3 °F (36.3 °C)  "(Temporal)   Resp (!) 25   Ht 5' 10\" (1.778 m)   Wt 77.1 kg (170 lb)   SpO2 100%   BMI 24.39 kg/m²     PHYSICAL EXAM    Gen: NAD AAOx3  Head: Normocephalic, Atraumatic  CV: S1S2 RRR no m/r/g  CHEST: Clear b/l no c/r/w  ABD: soft, +BS NT/ND  EXT: no edema    ASSESSMENT/PLAN:  This is a 48 y.o. year old male here for EGD and colonoscopy, and he is stable and optimized for his procedure.        "

## 2024-06-04 NOTE — ANESTHESIA POSTPROCEDURE EVALUATION
Post-Op Assessment Note    CV Status:  Stable  Pain Score: 0    Pain management: adequate       Mental Status:  Sleepy   Hydration Status:  Euvolemic   PONV Controlled:  Controlled   Airway Patency:  Patent     Post Op Vitals Reviewed: Yes    No anethesia notable event occurred.    Staff: LIN           BP   101/67   Temp      Pulse 67   Resp 14   SpO2 97%ra

## 2024-06-07 PROCEDURE — 88305 TISSUE EXAM BY PATHOLOGIST: CPT | Performed by: STUDENT IN AN ORGANIZED HEALTH CARE EDUCATION/TRAINING PROGRAM

## 2024-06-08 ENCOUNTER — TELEPHONE (OUTPATIENT)
Age: 49
End: 2024-06-08

## 2024-06-08 DIAGNOSIS — K70.9 ALCOHOLIC LIVER DISEASE (HCC): Primary | ICD-10-CM

## 2024-06-08 NOTE — RESULT ENCOUNTER NOTE
Called patient and reviewed path.  No H. pylori.  Did have a tubulovillous adenoma completely excised high-grade dysplasia in the polyp.  Recall for 3 years.  Please copy patient's PCP

## 2024-06-08 NOTE — TELEPHONE ENCOUNTER
Reviewed results with the patient of his colonoscopic biopsies.  Indicated to him and he did not have esophageal varices.  Elasticity study previously moderately high stiffness 9 in the high and level.  Patient wants to know if his liver disease is reversible at this point and I did tell him there is a good possibility that it is provided he stopped drinking.  Will have patient come back in the office in about 4 to 8 weeks.  Ordering lab studies in the meantime.

## 2024-12-22 LAB
ALBUMIN SERPL-MCNC: 4.3 G/DL (ref 4.1–5.1)
ALP SERPL-CCNC: 74 IU/L (ref 44–121)
ALT SERPL-CCNC: 98 IU/L (ref 0–44)
AST SERPL-CCNC: 69 IU/L (ref 0–40)
BASOPHILS # BLD AUTO: 0 X10E3/UL (ref 0–0.2)
BASOPHILS NFR BLD AUTO: 0 %
BILIRUB SERPL-MCNC: 0.6 MG/DL (ref 0–1.2)
BUN SERPL-MCNC: 6 MG/DL (ref 6–24)
BUN/CREAT SERPL: 8 (ref 9–20)
CALCIUM SERPL-MCNC: 9 MG/DL (ref 8.7–10.2)
CHLORIDE SERPL-SCNC: 105 MMOL/L (ref 96–106)
CO2 SERPL-SCNC: 25 MMOL/L (ref 20–29)
CREAT SERPL-MCNC: 0.77 MG/DL (ref 0.76–1.27)
EGFR: 111 ML/MIN/1.73
EOSINOPHIL # BLD AUTO: 0 X10E3/UL (ref 0–0.4)
EOSINOPHIL NFR BLD AUTO: 1 %
ERYTHROCYTE [DISTWIDTH] IN BLOOD BY AUTOMATED COUNT: 11.7 % (ref 11.6–15.4)
GLOBULIN SER-MCNC: 2.4 G/DL (ref 1.5–4.5)
GLUCOSE SERPL-MCNC: 107 MG/DL (ref 70–99)
HCT VFR BLD AUTO: 46.3 % (ref 37.5–51)
HGB BLD-MCNC: 15.2 G/DL (ref 13–17.7)
IMM GRANULOCYTES # BLD: 0 X10E3/UL (ref 0–0.1)
IMM GRANULOCYTES NFR BLD: 0 %
LYMPHOCYTES # BLD AUTO: 1.7 X10E3/UL (ref 0.7–3.1)
LYMPHOCYTES NFR BLD AUTO: 27 %
MCH RBC QN AUTO: 28.7 PG (ref 26.6–33)
MCHC RBC AUTO-ENTMCNC: 32.8 G/DL (ref 31.5–35.7)
MCV RBC AUTO: 88 FL (ref 79–97)
MONOCYTES # BLD AUTO: 1 X10E3/UL (ref 0.1–0.9)
MONOCYTES NFR BLD AUTO: 15 %
NEUTROPHILS # BLD AUTO: 3.7 X10E3/UL (ref 1.4–7)
NEUTROPHILS NFR BLD AUTO: 57 %
PLATELET # BLD AUTO: 223 X10E3/UL (ref 150–450)
POTASSIUM SERPL-SCNC: 4.4 MMOL/L (ref 3.5–5.2)
PROT SERPL-MCNC: 6.7 G/DL (ref 6–8.5)
RBC # BLD AUTO: 5.29 X10E6/UL (ref 4.14–5.8)
SODIUM SERPL-SCNC: 143 MMOL/L (ref 134–144)
WBC # BLD AUTO: 6.5 X10E3/UL (ref 3.4–10.8)

## 2024-12-23 ENCOUNTER — RESULTS FOLLOW-UP (OUTPATIENT)
Dept: GASTROENTEROLOGY | Facility: CLINIC | Age: 49
End: 2024-12-23

## 2024-12-24 NOTE — RESULT ENCOUNTER NOTE
Reviewed and message sent in [employer].  Patient still with elevated LFTs.  Some improvement.  LFTs 1-1/2-2 times normal AST 1-1/2 time normal ALT 2 times normal  -Advise follow-up this visit 1 to 2 months

## 2025-01-13 ENCOUNTER — TELEPHONE (OUTPATIENT)
Dept: GASTROENTEROLOGY | Facility: CLINIC | Age: 50
End: 2025-01-13

## 2025-01-13 NOTE — TELEPHONE ENCOUNTER
Lvm for pt appt scheduled for 1/14 has been rescheduled from [place] due to no heat to [place] location at 2:00

## 2025-01-14 ENCOUNTER — OFFICE VISIT (OUTPATIENT)
Age: 50
End: 2025-01-14
Payer: COMMERCIAL

## 2025-01-14 VITALS
DIASTOLIC BLOOD PRESSURE: 82 MMHG | HEIGHT: 70 IN | SYSTOLIC BLOOD PRESSURE: 118 MMHG | WEIGHT: 167 LBS | BODY MASS INDEX: 23.91 KG/M2

## 2025-01-14 DIAGNOSIS — K70.9 ALCOHOLIC LIVER DISEASE (HCC): Primary | ICD-10-CM

## 2025-01-14 DIAGNOSIS — E78.5 HYPERLIPIDEMIA, UNSPECIFIED HYPERLIPIDEMIA TYPE: ICD-10-CM

## 2025-01-14 DIAGNOSIS — Z12.11 SCREENING FOR COLON CANCER: ICD-10-CM

## 2025-01-14 DIAGNOSIS — F10.90 ALCOHOL USE DISORDER: ICD-10-CM

## 2025-01-14 DIAGNOSIS — Z86.0100 PERSONAL HISTORY OF COLON POLYPS, UNSPECIFIED: ICD-10-CM

## 2025-01-14 PROCEDURE — 99214 OFFICE O/P EST MOD 30 MIN: CPT | Performed by: INTERNAL MEDICINE

## 2025-01-14 RX ORDER — SITAGLIPTIN 100 MG/1
100 TABLET, FILM COATED ORAL DAILY
COMMUNITY

## 2025-01-14 NOTE — PATIENT INSTRUCTIONS
Name: Julio Peters      : 1975      MRN: [id number]  Encounter Provider: Joseph Velasquez MD  Encounter Date: 2025   Encounter department: [facility] [place]  :  Assessment & Plan  Alcoholic liver disease (HCC)  -Patient with recent inpatient rehab stay.  He had a 14-day session.  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  He is on Vivitrol with good results.  He is seeing psychiatry as well.  He has not been drinking except perhaps a beer or so on the weekends when he is watching football  -Strongly advise continued Vivitrol and counseling.  -Patient's elasticity study last spring showed fibrosis but did not meet the criteria for cirrhosis.  -Patient did have recent repeat labs done in late December and his AST and ALT were just a couple of points over normal much better than they had been previously and repeat labs in about 3 months    Orders:    Comprehensive metabolic panel; Future    CBC and differential; Future    Gamma GT; Future    LAKE Fibrosure; Future    Alcohol use disorder  See above       Hyperlipidemia, unspecified hyperlipidemia type  Patient with mild elevation of cholesterol.  His PCP Dr. Panchal was wondering if she could initiate statin therapy.  No contraindication to statin therapy  -Statin therapy oftentimes hepatic protective and may help in alcoholic liver disease       Screening for colon cancer  To date with screening.  Patient last spring had colonoscopy.  Please see details below       Personal history of colon polyps, unspecified  Patient with a tubulovillous adenoma with dysplasia within the polyp.  This was completely resected.  Patient would be due for recall examination in 2027       4 month

## 2025-01-14 NOTE — ASSESSMENT & PLAN NOTE
Patient with a tubulovillous adenoma with dysplasia within the polyp.  This was completely resected.  Patient would be due for recall examination in June 2027       4 months

## 2025-01-14 NOTE — PROGRESS NOTES
Name: Julio Peters      : 1975      MRN: [id number]  Encounter Provider: Joseph Velasquez MD  Encounter Date: 2025   Encounter department: [facility] [place]  :  Assessment & Plan  Alcoholic liver disease (HCC)  -Patient with recent inpatient rehab stay.  He had a 14-day session.  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  He is on Vivitrol with good results.  He is seeing psychiatry as well.  He has not been drinking except perhaps a beer or so on the weekends when he is watching football  -Strongly advise continued Vivitrol and counseling.    -Patient's elasticity study last spring showed fibrosis but did not meet the criteria for cirrhosis.  -Patient did have recent repeat labs done in late December and his AST and ALT were just a couple of points over normal much better than they had been previously and repeat labs in about 3 months    Orders:    Comprehensive metabolic panel; Future    CBC and differential; Future    Gamma GT; Future    LAKE Fibrosure; Future    Alcohol use disorder  See above       Hyperlipidemia, unspecified hyperlipidemia type  Patient with mild elevation of cholesterol.  His PCP Dr. Panchal was wondering if she could initiate statin therapy.  No contraindication to statin therapy  -Statin therapy oftentimes hepatic protective and may help in alcoholic liver disease       Screening for colon cancer  To date with screening.  Patient last spring had colonoscopy.  Please see details below       Personal history of colon polyps, unspecified  Patient with a tubulovillous adenoma with dysplasia within the polyp.  This was completely resected.  Patient would be due for recall examination in 2027       4 months       History of Present Illness   HPI  Julio Peters is a 49 y.o. male who presents the office for routine follow-up.  Patient was evaluated for abnormal liver function studies and what  "appeared to be alcoholic liver disease.  He had serologic workup which was negative for viral hepatitis.  Patient had a previous history of very heavy alcohol use which continued until this December.  He had a 2-week stay in rehab and has been doing quite well since that time.  He is not completely abstinent but may limit drinking some.  To about once a week when he is watching football.  He is getting Vivitrol.  He is seeing psychiatry.  He denies any abdominal pain.  He feels well.  His PCP Dr. Panchal is inquiring about whether he can be on a statin given his history of diabetes.  Overall he has better energy.  He is working more so he has less time on his hand to drink.  Patient is up-to-date with respect to colon cancer screening.  Did have an examination for positive Cologuard and had a tubulovillous adenoma removed that had some mild dysplasia within the polyp but clear resection margins      Review of Systems       Objective   /82   Ht 5' 10\" (1.778 m)   Wt 75.8 kg (167 lb)   BMI 23.96 kg/m²      Physical Exam  ..General Appearance: NAD, cooperative, alert  Eyes: Anicteric, conjunctiva pink  ENT:  Normocephalic, atraumatic, normal mucosa-mild parotid enlargement.    Neck:    Supple, symmetrical, trachea midline  Resp:  Clear to auscultation bilaterally; no rales, rhonchi or wheezing; respirations unlabored   CV:  S1 S2, Regular rate and rhythm; no murmur, rub, or gallop.  GI:  Soft, non-tender, non-distended; normal bowel sounds; no masses, no organomegaly   Rectal: Deferred  Musculoskeletal: No cyanosis, clubbing or edema. Normal ROM.  Skin:     No jaundice, rashes, or lesions-spider angiomata or palmar erythemaHeme/Lymph: No palpable cervical lymphadenopathy  Psych: Normal affect, good eye contact  Neuro: No gross deficits, AAOx3        "

## 2025-05-09 LAB
ALBUMIN SERPL-MCNC: 4.6 G/DL (ref 4.1–5.1)
ALP SERPL-CCNC: 92 IU/L (ref 44–121)
ALT SERPL-CCNC: 55 IU/L (ref 0–44)
AST SERPL-CCNC: 67 IU/L (ref 0–40)
BASOPHILS # BLD AUTO: 0 X10E3/UL (ref 0–0.2)
BASOPHILS NFR BLD AUTO: 0 %
BILIRUB SERPL-MCNC: 0.6 MG/DL (ref 0–1.2)
BUN SERPL-MCNC: 7 MG/DL (ref 6–24)
BUN/CREAT SERPL: 11 (ref 9–20)
CALCIUM SERPL-MCNC: 9.3 MG/DL (ref 8.7–10.2)
CHLORIDE SERPL-SCNC: 100 MMOL/L (ref 96–106)
CO2 SERPL-SCNC: 18 MMOL/L (ref 20–29)
CREAT SERPL-MCNC: 0.66 MG/DL (ref 0.76–1.27)
EGFR: 116 ML/MIN/1.73
EOSINOPHIL # BLD AUTO: 0 X10E3/UL (ref 0–0.4)
EOSINOPHIL NFR BLD AUTO: 0 %
ERYTHROCYTE [DISTWIDTH] IN BLOOD BY AUTOMATED COUNT: 12.5 % (ref 11.6–15.4)
GGT SERPL-CCNC: 208 IU/L (ref 0–65)
GLOBULIN SER-MCNC: 2.5 G/DL (ref 1.5–4.5)
GLUCOSE SERPL-MCNC: 113 MG/DL (ref 70–99)
HCT VFR BLD AUTO: 45.4 % (ref 37.5–51)
HGB BLD-MCNC: 14.8 G/DL (ref 13–17.7)
IMM GRANULOCYTES # BLD: 0 X10E3/UL (ref 0–0.1)
IMM GRANULOCYTES NFR BLD: 0 %
LYMPHOCYTES # BLD AUTO: 1.6 X10E3/UL (ref 0.7–3.1)
LYMPHOCYTES NFR BLD AUTO: 21 %
MCH RBC QN AUTO: 28.3 PG (ref 26.6–33)
MCHC RBC AUTO-ENTMCNC: 32.6 G/DL (ref 31.5–35.7)
MCV RBC AUTO: 87 FL (ref 79–97)
MONOCYTES # BLD AUTO: 0.7 X10E3/UL (ref 0.1–0.9)
MONOCYTES NFR BLD AUTO: 10 %
NEUTROPHILS # BLD AUTO: 5.2 X10E3/UL (ref 1.4–7)
NEUTROPHILS NFR BLD AUTO: 69 %
PLATELET # BLD AUTO: 213 X10E3/UL (ref 150–450)
POTASSIUM SERPL-SCNC: 4.3 MMOL/L (ref 3.5–5.2)
PROT SERPL-MCNC: 7.1 G/DL (ref 6–8.5)
RBC # BLD AUTO: 5.23 X10E6/UL (ref 4.14–5.8)
SODIUM SERPL-SCNC: 136 MMOL/L (ref 134–144)
WBC # BLD AUTO: 7.6 X10E3/UL (ref 3.4–10.8)

## 2025-05-10 LAB
A2 MACROGLOB SERPL-MCNC: 128 MG/DL (ref 110–276)
ALT SERPL W P-5'-P-CCNC: 65 IU/L (ref 0–55)
APO A-I SERPL-MCNC: 188 MG/DL (ref 101–178)
AST SERPL W P-5'-P-CCNC: 77 IU/L (ref 0–40)
BILIRUB SERPL-MCNC: 0.5 MG/DL (ref 0–1.2)
CHOLEST SERPL-MCNC: 210 MG/DL (ref 100–199)
FIBROSIS SCORING:: ABNORMAL
FIBROSIS STAGE SERPL QL: ABNORMAL
GGT SERPL-CCNC: 213 IU/L (ref 0–65)
GLUCOSE SERPL-MCNC: 120 MG/DL (ref 70–99)
HAPTOGLOB SERPL-MCNC: 78 MG/DL (ref 23–355)
INTERPRETATION: ABNORMAL
LABORATORY COMMENT REPORT: ABNORMAL
LIVER FIBR SCORE SERPL CALC.FIBROSURE: 0.18 (ref 0–0.21)
NASH GRADE SERPL QL: ABNORMAL
NASH SCORE SERPL: 0.77 (ref 0–0.25)
REF LAB TEST METHOD: ABNORMAL
SL AMB NASH SCORING: ABNORMAL
SL AMB STEATOSIS GRADE: ABNORMAL
SL AMB STEATOSIS SCORE: 0.67 (ref 0–0.4)
STEATOSIS SCORING: ABNORMAL
TEST PERFORMANCE INFO SPEC: ABNORMAL
TRIGL SERPL-MCNC: 104 MG/DL (ref 0–149)

## 2025-05-14 ENCOUNTER — OFFICE VISIT (OUTPATIENT)
Age: 50
End: 2025-05-14
Payer: COMMERCIAL

## 2025-05-14 VITALS
HEIGHT: 70 IN | WEIGHT: 173 LBS | SYSTOLIC BLOOD PRESSURE: 104 MMHG | DIASTOLIC BLOOD PRESSURE: 80 MMHG | BODY MASS INDEX: 24.77 KG/M2

## 2025-05-14 DIAGNOSIS — F10.10 ALCOHOL USE DISORDER, MILD, ABUSE: ICD-10-CM

## 2025-05-14 DIAGNOSIS — Z12.11 SCREENING FOR COLON CANCER: ICD-10-CM

## 2025-05-14 DIAGNOSIS — K75.81 METABOLIC DYSFUNCTION-ASSOCIATED STEATOHEPATITIS (MASH): Primary | ICD-10-CM

## 2025-05-14 DIAGNOSIS — E11.9 TYPE 2 DIABETES MELLITUS WITHOUT COMPLICATION, WITHOUT LONG-TERM CURRENT USE OF INSULIN (HCC): ICD-10-CM

## 2025-05-14 PROCEDURE — 99214 OFFICE O/P EST MOD 30 MIN: CPT | Performed by: INTERNAL MEDICINE

## 2025-05-14 RX ORDER — UPADACITINIB 15 MG/1
TABLET, EXTENDED RELEASE ORAL
COMMUNITY
Start: 2025-04-10

## 2025-05-14 RX ORDER — ATORVASTATIN CALCIUM 20 MG/1
1 TABLET, FILM COATED ORAL DAILY
COMMUNITY
Start: 2025-05-13

## 2025-05-14 RX ORDER — ATENOLOL 50 MG/1
50 TABLET ORAL DAILY
COMMUNITY

## 2025-05-14 NOTE — PROGRESS NOTES
Name: Julio Peters      : 1975      MRN: [id number]  Encounter Provider: Joseph Velasquez MD  Encounter Date: 2025   Encounter department: [facility] [place]  :  Assessment & Plan  Metabolic dysfunction-associated steatohepatitis (MASH)  - Patient with abnormal transaminases and hepatic steatosis.  Mostly on the basis of chronic alcohol use.  Contributions with hyperlipidemia and diabetes.  Patient has some mild central obesity.  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  -Patient is on a statin.  LFTs are favorable.  Recent FibroSure study shows low risk for fibrosis.  Elasticity study last year was more advanced fibrosis possibly-possibly in the F3 area.  Coefficient stiffness 2.0 to high stiffness is 2.0  Repeat studies in 6 months  - Recent labs from this month AST 67 ALT 55 bilirubin 0.6     Orders:    US abdomen complete; Future    Comprehensive metabolic panel; Future    CBC and differential; Future    Alcohol use disorder, mild, abuse  Patient continues to drink about 1 beer per day.  This is much better than in previous pattern which was upwards of 10-12 beers per day.  Patient did have a rehab visit for 14 days and actually was being seen by counseling.  Would try to encourage abstinence and consider repeat counseling for this ongoing struggle.       Type 2 diabetes mellitus without complication, without long-term current use of insulin (HCC)  Patient with reasonable control on exam  Lab Results   Component Value Date    HGBA1C 6.7 2024            Screening for colon cancer  Patient with advanced adenoma on colonoscopy in 2024.  Had tubulovillous adenoma with mild dysplasia but normal resection line.  Recall exam for         6 mo - Dr. Bryson     History of Present Illness   HPI  Julio Peters is a 49 y.o. male who turns to the office for routine follow-up with respect to his abnormal liver  "function studies and mass.  Patient had significantly elevated transaminases when we first evaluated a little over a year ago.  He was drinking upwards of 10-12 beers per day.  He tried various ways on his own to try to cut back but this was quite difficult.  Patient late last year had a 14-day rehab stay.  Since that time he is markedly cut back on drinking and reports having 1 beer a day perhaps 7 to 8/week.  Patient also has other comorbid issues that contribute to the fatty liver which include hyperlipidemia and diabetes.  He gets very little exercise.  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  He is on medication for his diabetes.  He was recently started on Rinvok for dermatologic condition which he reports he will be off of the medication relatively soon.  I did review studies from an outside lab which he obtained in February before starting this medication and they were slightly better than the ones as outlined above in performed last week.  This density study showed moderate fibrosis.  Upper endoscopy last year revealed gastritis but no varices  He is up-to-date with respect to colon cancer screening.  Patient did have colonoscopy last spring.  He did have an advanced adenoma at that time      Review of Systems  Medical History Reviewed by provider this encounter:  Tobacco  Allergies  Meds  Med Hx  Surg Hx  Fam Hx  Soc Hx    .     Objective   /80   Ht 5' 10\" (1.778 m)   Wt 78.5 kg (173 lb)   BMI 24.82 kg/m²      Physical Exam  .General Appearance: NAD, cooperative, alert  Eyes: Anicteric, conjunctiva pink  ENT:  Normocephalic, atraumatic, normal mucosa.  Mild parotid enlargement  Neck:    Supple, symmetrical, trachea midline  Resp:  Clear to auscultation bilaterally; no rales, rhonchi or wheezing; respirations unlabored   CV:  S1 S2, Regular rate and rhythm; no murmur, rub, or gallop.  GI:  Soft, non-tender, non-distended; normal bowel sounds; no masses, no " organomegaly Central obesity-mild  Rectal: Deferred  Musculoskeletal: No cyanosis, clubbing or edema. Normal ROM.  Skin:     No jaundice, rashes, or lesions slight palmar erythema  Heme/Lymph: No palpable cervical lymphadenopathy  Psych: Normal affect, good eye contact  Neuro: No gross deficits, AAOx3

## 2025-05-14 NOTE — ASSESSMENT & PLAN NOTE
Patient with reasonable control on exam  Lab Results   Component Value Date    HGBA1C 6.7 03/06/2024

## 2025-08-08 RX ORDER — ATORVASTATIN CALCIUM 20 MG/1
20 TABLET, FILM COATED ORAL DAILY
Qty: 30 TABLET | OUTPATIENT
Start: 2025-08-08

## 2025-08-25 PROBLEM — R79.89 ELEVATED LIVER FUNCTION TESTS: Status: ACTIVE | Noted: 2025-08-25

## 2025-08-25 PROBLEM — J30.9 ALLERGIC RHINITIS: Status: ACTIVE | Noted: 2025-08-25

## 2025-08-25 PROBLEM — E11.29 TYPE 2 DIABETES MELLITUS WITH OTHER DIABETIC KIDNEY COMPLICATION (HCC): Status: ACTIVE | Noted: 2025-08-25

## 2025-08-25 PROBLEM — L40.9 PSORIASIS: Status: ACTIVE | Noted: 2025-08-25

## 2025-08-25 PROBLEM — K76.0 FATTY (CHANGE OF) LIVER, NOT ELSEWHERE CLASSIFIED: Status: ACTIVE | Noted: 2025-08-25

## 2025-08-25 PROBLEM — F33.9 MAJOR DEPRESSIVE DISORDER, RECURRENT (HCC): Status: ACTIVE | Noted: 2025-08-25

## 2025-08-25 PROBLEM — E66.3 OVERWEIGHT: Status: ACTIVE | Noted: 2025-08-25

## 2025-08-25 PROBLEM — K70.0 ALCOHOLIC FATTY LIVER: Status: ACTIVE | Noted: 2025-08-25

## 2025-08-25 PROBLEM — R80.9 PROTEINURIA: Status: ACTIVE | Noted: 2025-08-25

## 2025-08-25 PROBLEM — E53.8 VITAMIN B12 DEFICIENCY: Status: ACTIVE | Noted: 2025-08-25
